# Patient Record
Sex: FEMALE | Race: BLACK OR AFRICAN AMERICAN | HISPANIC OR LATINO | Employment: FULL TIME | URBAN - METROPOLITAN AREA
[De-identification: names, ages, dates, MRNs, and addresses within clinical notes are randomized per-mention and may not be internally consistent; named-entity substitution may affect disease eponyms.]

---

## 2024-08-10 ENCOUNTER — HOSPITAL ENCOUNTER (EMERGENCY)
Facility: HOSPITAL | Age: 29
Discharge: HOME | End: 2024-08-10
Attending: EMERGENCY MEDICINE
Payer: COMMERCIAL

## 2024-08-10 VITALS
WEIGHT: 155.87 LBS | TEMPERATURE: 99.1 F | DIASTOLIC BLOOD PRESSURE: 102 MMHG | BODY MASS INDEX: 25.97 KG/M2 | HEIGHT: 65 IN | HEART RATE: 62 BPM | OXYGEN SATURATION: 100 % | SYSTOLIC BLOOD PRESSURE: 144 MMHG | RESPIRATION RATE: 16 BRPM

## 2024-08-10 DIAGNOSIS — L01.00 IMPETIGO: Primary | ICD-10-CM

## 2024-08-10 PROCEDURE — 87147 CULTURE TYPE IMMUNOLOGIC: CPT | Performed by: EMERGENCY MEDICINE

## 2024-08-10 PROCEDURE — 96372 THER/PROPH/DIAG INJ SC/IM: CPT

## 2024-08-10 PROCEDURE — 87070 CULTURE OTHR SPECIMN AEROBIC: CPT | Performed by: EMERGENCY MEDICINE

## 2024-08-10 PROCEDURE — 25010000002 KETOROLAC TROMETHAMINE PER 15 MG: Performed by: EMERGENCY MEDICINE

## 2024-08-10 PROCEDURE — 87205 SMEAR GRAM STAIN: CPT | Performed by: EMERGENCY MEDICINE

## 2024-08-10 PROCEDURE — 99283 EMERGENCY DEPT VISIT LOW MDM: CPT

## 2024-08-10 PROCEDURE — 87529 HSV DNA AMP PROBE: CPT | Performed by: EMERGENCY MEDICINE

## 2024-08-10 PROCEDURE — 87186 SC STD MICRODIL/AGAR DIL: CPT | Performed by: EMERGENCY MEDICINE

## 2024-08-10 RX ORDER — SPIRONOLACTONE 25 MG/1
25 TABLET ORAL DAILY
COMMUNITY

## 2024-08-10 RX ORDER — KETOROLAC TROMETHAMINE 15 MG/ML
15 INJECTION, SOLUTION INTRAMUSCULAR; INTRAVENOUS ONCE
Status: COMPLETED | OUTPATIENT
Start: 2024-08-10 | End: 2024-08-10

## 2024-08-10 RX ORDER — PROPRANOLOL HYDROCHLORIDE 10 MG/1
10 TABLET ORAL 2 TIMES DAILY
COMMUNITY

## 2024-08-10 RX ORDER — AMLODIPINE BESYLATE 5 MG/1
5 TABLET ORAL DAILY
COMMUNITY

## 2024-08-10 RX ORDER — CEPHALEXIN 500 MG/1
500 CAPSULE ORAL 4 TIMES DAILY
Qty: 28 CAPSULE | Refills: 0 | Status: SHIPPED | OUTPATIENT
Start: 2024-08-10 | End: 2024-08-17

## 2024-08-10 RX ORDER — CEPHALEXIN 500 MG/1
500 CAPSULE ORAL 4 TIMES DAILY
Qty: 28 CAPSULE | Refills: 0 | Status: CANCELLED | OUTPATIENT
Start: 2024-08-10 | End: 2024-08-17

## 2024-08-10 RX ORDER — VALACYCLOVIR HYDROCHLORIDE 500 MG/1
500 TABLET, FILM COATED ORAL 2 TIMES DAILY
COMMUNITY

## 2024-08-10 RX ADMIN — KETOROLAC TROMETHAMINE 15 MG: 15 INJECTION, SOLUTION INTRAMUSCULAR; INTRAVENOUS at 11:50

## 2024-08-10 NOTE — ED PROVIDER NOTES
Time: 11:24 AM EDT  Date of encounter:  8/10/2024  Independent Historian/Clinical History and Information was obtained by:   Patient    History is limited by: N/A    Chief Complaint: Lip rash, ulcerations      History of Present Illness:  Patient is a 28 y.o. year old female who presents to the emergency department for evaluation of lip rash and ulcerations.  States been ongoing for about a week now.  States that she thought she had a cold sore and went to an urgent care and got acyclovir.  Does have a history of cold sores in the past.  States that it has gotten progressively worse and has not improved.  She states that is also starting to crusting now.  Has had difficulty swallowing eating because of the pain on her lips.  She does report that she was tested for HSV as well as STDs recently in the last week and they were negative.  No other complaints this time.      Patient Care Team  Primary Care Provider: Provider, Ana Maria Known    Past Medical History:     Allergies   Allergen Reactions    Lisinopril Cough     Past Medical History:   Diagnosis Date    Graves disease     Hypertension      Past Surgical History:   Procedure Laterality Date    TONSILLECTOMY       History reviewed. No pertinent family history.    Home Medications:  Prior to Admission medications    Medication Sig Start Date End Date Taking? Authorizing Provider   amLODIPine (NORVASC) 5 MG tablet Take 1 tablet by mouth Daily.    ProviderSilvio MD   propranolol (INDERAL) 10 MG tablet Take 1 tablet by mouth 2 (Two) Times a Day.    Silvio Harkins MD   spironolactone (ALDACTONE) 25 MG tablet Take 1 tablet by mouth Daily.    Silvio Harkins MD   valACYclovir (VALTREX) 500 MG tablet Take 1 tablet by mouth 2 (Two) Times a Day.    ProviderSilvio MD        Social History:   Social History     Tobacco Use    Smoking status: Never   Substance Use Topics    Alcohol use: Yes     Comment: occassionally         Review of Systems:  Review of  "Systems   Skin:  Positive for rash and wound.        Physical Exam:  BP (!) 154/104 (BP Location: Right arm, Patient Position: Lying)   Pulse 70   Temp 99.2 °F (37.3 °C) (Oral)   Resp 18   Ht 165.1 cm (65\")   Wt 70.7 kg (155 lb 13.8 oz)   LMP 07/07/2024 (Approximate)   SpO2 98%   BMI 25.94 kg/m²     Physical Exam  Vitals and nursing note reviewed.   Constitutional:       Appearance: Normal appearance.   HENT:      Head: Normocephalic.      Mouth/Throat:      Comments: Honey colored lesions on the lips and upper jaw.  This is consistent with impetigo.  See photo.  There is no lesions noted within the mouth.  Pulmonary:      Effort: Pulmonary effort is normal.   Neurological:      Mental Status: She is alert.                  Procedures:  Procedures      Medical Decision Making:      Comorbidities that affect care:    Cold sores, hypertension    External Notes reviewed:    Reviewed results on the patient's phone from her recent test.      The following orders were placed and all results were independently analyzed by me:  Orders Placed This Encounter   Procedures    Wound Culture - Swab, Lip, Lower    HSV 1/2, PCR (Non-CSF) - Swab, Lip, Lower       Medications Given in the Emergency Department:  Medications   ketorolac (TORADOL) injection 15 mg (has no administration in time range)        ED Course:         Labs:    Lab Results (last 24 hours)       ** No results found for the last 24 hours. **             Imaging:    No Radiology Exams Resulted Within Past 24 Hours      Differential Diagnosis and Discussion:    Rash: Differential diagnosis includes but is not limited to sepsis, cellulitis, [place] Spotted Fever, meningitis, meningococcemia, Varicella, Strep infection, dermatitis, allergic reaction, Lyme disease, and toxic shock syndrome.  Wound Evaluation: Differential diagnosis includes but is not limited to laceration, abrasion, puncture, burn, ulcer, cellulitis, abscess, vasculitis, malignancy, and " rash.        MDM     Patient with wounds to the upper and lower lips as well as some honey colored crusting.  This is pretty classically consistent with impetigo.  Has been on acyclovir for cold sore and was also tested negative for HSV recently.  Will retest for HSV as well as obtain a wound culture.  At this time will place on oral and topical antibiotics for impetigo.  Patient otherwise well-appearing at this time.  Will DC.          Patient Care Considerations:          Consultants/Shared Management Plan:    None    Social Determinants of Health:    Patient is independent, reliable, and has access to care.       Disposition and Care Coordination:    Discharged: The patient is suitable and stable for discharge with no need for consideration of admission.    I have explained the patient´s condition, diagnoses and treatment plan based on the information available to me at this time. I have answered questions and addressed any concerns. The patient has a good  understanding of the patient´s diagnosis, condition, and treatment plan as can be expected at this point. The vital signs have been stable. The patient´s condition is stable and appropriate for discharge from the emergency department.      The patient will pursue further outpatient evaluation with the primary care physician or other designated or consulting physician as outlined in the discharge instructions. They are agreeable to this plan of care and follow-up instructions have been explained in detail. The patient has received these instructions in written format and have expressed an understanding of the discharge instructions. The patient is aware that any significant change in condition or worsening of symptoms should prompt an immediate return to this or the closest emergency department or call to 911.      Final diagnoses:   Impetigo        ED Disposition       ED Disposition   Discharge    Condition   Stable    Comment   --               This medical  record created using voice recognition software.             Chip Molina MD  08/10/24 0042

## 2024-08-12 LAB
HSV1 DNA SPEC QL NAA+PROBE: NOT DETECTED
HSV2 DNA SPEC QL NAA+PROBE: NOT DETECTED

## 2024-08-13 LAB
BACTERIA SPEC AEROBE CULT: ABNORMAL
BACTERIA SPEC AEROBE CULT: ABNORMAL
GRAM STN SPEC: ABNORMAL